# Patient Record
Sex: MALE | Race: WHITE
[De-identification: names, ages, dates, MRNs, and addresses within clinical notes are randomized per-mention and may not be internally consistent; named-entity substitution may affect disease eponyms.]

---

## 2018-05-28 ENCOUNTER — HOSPITAL ENCOUNTER (INPATIENT)
Dept: HOSPITAL 56 - MW.ED | Age: 53
LOS: 2 days | Discharge: HOME | DRG: 291 | End: 2018-05-30
Attending: INTERNAL MEDICINE | Admitting: INTERNAL MEDICINE
Payer: COMMERCIAL

## 2018-05-28 DIAGNOSIS — J44.1: ICD-10-CM

## 2018-05-28 DIAGNOSIS — F17.210: ICD-10-CM

## 2018-05-28 DIAGNOSIS — F32.9: ICD-10-CM

## 2018-05-28 DIAGNOSIS — I50.31: Primary | ICD-10-CM

## 2018-05-28 DIAGNOSIS — E66.8: ICD-10-CM

## 2018-05-28 DIAGNOSIS — J96.01: ICD-10-CM

## 2018-05-28 DIAGNOSIS — E11.65: ICD-10-CM

## 2018-05-28 DIAGNOSIS — D72.828: ICD-10-CM

## 2018-05-28 DIAGNOSIS — I11.0: ICD-10-CM

## 2018-05-28 LAB
CHLORIDE SERPL-SCNC: 99 MMOL/L (ref 98–107)
SODIUM SERPL-SCNC: 135 MMOL/L (ref 136–148)

## 2018-05-28 NOTE — EDM.PDOC
ED HPI GENERAL MEDICAL PROBLEM





- General


Chief Complaint: Respiratory Problem


Stated Complaint: SOB


Time Seen by Provider: 05/28/18 10:27





- History of Present Illness


INITIAL COMMENTS - FREE TEXT/NARRATIVE: 





HISTORY AND PHYSICAL:





History of present illness:


The patient is a 52-year-old male presents with a two-week history of 

progressive shortness of breath and coughing occasionally productive of phlegm 

and feeling like he has great difficulty breathing. The patient said that about 

2 weeks ago he thought he had cold symptoms and that there was some phlegm and 

he felt short of breath and that seemed to improve slightly from the phlegm 

perspective but the shortness of breath seemed to progress. Patient does have a 

history of sleep apnea and is supposed to use CPAP but due to his insurance he 

does not have that. He sleeps in a recliner for the most of the nights but 

occasionally he will sleep in bed. He says that he has not had a fever vomiting 

abdominal pain urinary issues leg pain or swelling. He denies chest pain but 

says that he feels like there is phlegm in there or fluid that he cannot get 

out. The shortness of breath seemed to be worse today and he had a restless 

night so he came in for evaluation. He has a history of borderline diabetes and 

thinks he has high blood pressure but he has not seen her provider in quite 

some time and has no medical diagnosis nor does he take any medications. The 

patient is a smoker of one pack a day but no drug use.





Review of systems: 


As per history of present illness and below otherwise all systems reviewed and 

negative.





Past medical history: 


As per history of present illness and as reviewed below otherwise 

noncontributory.





Surgical history: 


As per history of present illness and as reviewed below otherwise 

noncontributory.





Social history: 


No reported history of drug or alcohol abuse.





Family history: 


As per history of present illness and as reviewed below otherwise 

noncontributory.





Physical exam:


General: Well-developed well-nourished man who is severely overweight but is 

speaking clearly in the ED with breathlessness only with activity. His O2 sat 

on room air ranges from 88-91% on my evaluation. His elevated blood pressure is 

also noted by me


HEENT: Atraumatic, normocephalic, pupils reactive, negative for conjunctival 

pallor or scleral icterus, mucous membranes moist, throat clear, neck supple, 

nontender, trachea midline.


Lungs: Upper lobes Clear to auscultation, very diminished in the bases and poor 

air exchange overall, there are occasional coarse breath sounds and slight 

crackles in the bases, breath sounds equal bilaterally, chest nontender. There 

is no work of breathing stridor


Heart: S1S2, regular rate and rhythm no overt murmurs, the patient was not legs 

to evaluate JVD


Abdomen: Soft, nondistended, nontender. Negative for masses or 

hepatosplenomegaly. Negative for costovertebral tenderness.


Pelvis: Stable nontender.


Genitourinary: Deferred.


Rectal: Deferred.


Extremities: Atraumatic, negative for cords or calf pain. Neurovascular 

unremarkable. Is trace pedal edema but no leg asymmetry or calf tenderness


Neuro: Awake, alert, oriented. Cranial nerves II through XII unremarkable. 

Cerebellum unremarkable. Motor and sensory unremarkable throughout. Exam 

nonfocal.


Skin: There is no diaphoresis, no overt skin lesions or rashes and turgor is 

normal


Diagnostics:


EKG chest x-ray CBC CMP BNP troponin INR lactic acid





Therapeutics:


IV O2 monitor aspirin duo neb nitro paste, Solu-Medrol Lasix





Testing results were discussed with the patient and the case was discussed with 

Dr. Sumner at 11:32 AM. The patient will be admitted as an inpatient and treated 

for congestive heart failure which is new as well as hypertension. He is aware 

of testing results and is agreeable.





Impression: 


Dyspnea progressive, new CHF, hypertension untreated





Definitive disposition and diagnosis as appropriate pending reevaluation and 

review of above.





- Related Data


 Allergies











Allergy/AdvReac Type Severity Reaction Status Date / Time


 


No Known Allergies Allergy   Verified 05/28/18 10:37











Home Meds: 


 Home Meds





. [No Known Home Meds]  05/28/18 [History]











Past Medical History


HEENT History: Reports: None


Cardiovascular History: Reports: None


Respiratory History: Reports: None


Gastrointestinal History: Reports: None


Genitourinary History: Reports: Renal Calculus


Musculoskeletal History: Reports: None


Neurological History: Reports: None


Psychiatric History: Reports: None


Endocrine/Metabolic History: Reports: Other (See Below)


Other Endocrine/Metabolic History: borderline DM


Hematologic History: Reports: None


Immunologic History: Reports: None


Oncologic (Cancer) History: Reports: None


Dermatologic History: Reports: None





- Infectious Disease History


Infectious Disease History: Reports: Chicken Pox





- Past Surgical History


HEENT Surgical History: Reports: Other (See Below)





Social & Family History





- Family History


Cardiac: Reports: Hypertension


Respiratory: Reports: None


Endocrine/Metabolic: Reports: Diabetes, type II


Immunologic: Reports: None


Dermatologic: Reports: None


Oncologic: Reports: Other (See Below)


Other Oncologic Family History: patient dont know the exact kind of cancer





ED ROS GENERAL





- Review of Systems


Review Of Systems: ROS reveals no pertinent complaints other than HPI.





ED EXAM, GENERAL





- Physical Exam


Exam: See Below (See dictation)





Course





- Vital Signs


Last Recorded V/S: 


 Last Vital Signs











Temp  36.1 C   05/28/18 10:32


 


Pulse  117 H  05/28/18 10:32


 


Resp  20   05/28/18 10:32


 


BP  206/146 H  05/28/18 10:32


 


Pulse Ox  94 L  05/28/18 10:33














- Orders/Labs/Meds


Orders: 


 Active Orders 24 hr











 Category Date Time Status


 


 Patient Status [ADT] Stat ADT  05/28/18 11:39 Ordered


 


 Cardiac Monitoring [RC] .AS DIRECTED Care  05/28/18 10:33 Active


 


 EKG Documentation Completion [RC] STAT Care  05/28/18 10:33 Active


 


 Oxygen Therapy, ED [RC] ASDIRECTED Care  05/28/18 10:33 Active


 


 Pulse Oximetry [RC] ASDIRECTED Care  05/28/18 10:33 Active


 


 RT Aerosol Therapy [RC] ASDIRECTED Care  05/28/18 10:34 Active


 


 Chest 1V Frontal [CR] Stat Exams  05/28/18 10:33 Taken


 


 Sodium Chloride 0.9% [Saline Flush] Med  05/28/18 10:33 Active





 10 ml FLUSH ASDIRECTED PRN   


 


 Sodium Chloride 0.9% [Saline Flush] Med  05/28/18 10:33 Active





 2.5 ml FLUSH ASDIRECTED PRN   


 


 Saline Lock Insert [OM.PC] Stat Oth  05/28/18 10:33 Ordered








 Medication Orders





Sodium Chloride (Saline Flush)  10 ml FLUSH ASDIRECTED PRN


   PRN Reason: Keep Vein Open


   Last Admin: 05/28/18 10:48  Dose: 10 ml


Sodium Chloride (Saline Flush)  2.5 ml FLUSH ASDIRECTED PRN


   PRN Reason: Keep Vein Open


   Last Admin: 05/28/18 10:49  Dose: 2.5 ml








Labs: 


 Laboratory Tests











  05/28/18 05/28/18 05/28/18 Range/Units





  10:45 10:45 10:45 


 


WBC  11.82 H    (4.0-11.0)  K/uL


 


RBC  5.68    (4.50-5.90)  M/uL


 


Hgb  17.8 H    (13.0-17.0)  g/dL


 


Hct  50.4 H    (38.0-50.0)  %


 


MCV  88.7    (80.0-98.0)  fL


 


MCH  31.3    (27.0-32.0)  pg


 


MCHC  35.3    (31.0-37.0)  g/dL


 


RDW Std Deviation  41.8    (28.0-62.0)  fl


 


RDW Coeff of Yeis  13    (11.0-15.0)  %


 


Plt Count  262    (150-400)  K/uL


 


MPV  10.80    (7.40-12.00)  fL


 


Neut % (Auto)  67.8    (48.0-80.0)  %


 


Lymph % (Auto)  24.2    (16.0-40.0)  %


 


Mono % (Auto)  6.3    (0.0-15.0)  %


 


Eos % (Auto)  1.4    (0.0-7.0)  %


 


Baso % (Auto)  0.3    (0.0-1.5)  %


 


Neut # (Auto)  8.0 H    (1.4-5.7)  K/uL


 


Lymph # (Auto)  2.9 H    (0.6-2.4)  K/uL


 


Mono # (Auto)  0.8    (0.0-0.8)  K/uL


 


Eos # (Auto)  0.2    (0.0-0.7)  K/uL


 


Baso # (Auto)  0.0    (0.0-0.1)  K/uL


 


Nucleated RBC %  0.0    /100WBC


 


Nucleated RBCs #  0    K/uL


 


INR   1.05   


 


Lactate     (0.20-2.00)  mmol/L


 


Sodium    135 L  (136-148)  mmol/L


 


Potassium    4.4  (3.5-5.1)  mmol/L


 


Chloride    99  ()  mmol/L


 


Carbon Dioxide    26.9  (21.0-32.0)  mmol/L


 


BUN    14  (7.0-18.0)  mg/dL


 


Creatinine    1.0  (0.8-1.3)  mg/dL


 


Est Cr Clr Drug Dosing    97.00  mL/min


 


Estimated GFR (MDRD)    > 60.0  ml/min


 


Glucose    245 H  ()  mg/dL


 


Calcium    8.9  (8.5-10.1)  mg/dL


 


Total Bilirubin    0.6  (0.2-1.0)  mg/dL


 


AST    18  (15-37)  IU/L


 


ALT    32  (14-63)  IU/L


 


Alkaline Phosphatase    63  ()  U/L


 


Troponin I    < 0.050  (0.000-0.056)  ng/mL


 


B-Natriuretic Peptide     (<100)  PG/ML


 


Total Protein    7.9  (6.4-8.2)  g/dL


 


Albumin    3.6  (3.4-5.0)  g/dL


 


Globulin    4.3 H  (2.0-3.5)  g/dL


 


Albumin/Globulin Ratio    0.8 L  (1.3-2.8)  














  05/28/18 05/28/18 Range/Units





  10:45 10:45 


 


WBC    (4.0-11.0)  K/uL


 


RBC    (4.50-5.90)  M/uL


 


Hgb    (13.0-17.0)  g/dL


 


Hct    (38.0-50.0)  %


 


MCV    (80.0-98.0)  fL


 


MCH    (27.0-32.0)  pg


 


MCHC    (31.0-37.0)  g/dL


 


RDW Std Deviation    (28.0-62.0)  fl


 


RDW Coeff of Yesi    (11.0-15.0)  %


 


Plt Count    (150-400)  K/uL


 


MPV    (7.40-12.00)  fL


 


Neut % (Auto)    (48.0-80.0)  %


 


Lymph % (Auto)    (16.0-40.0)  %


 


Mono % (Auto)    (0.0-15.0)  %


 


Eos % (Auto)    (0.0-7.0)  %


 


Baso % (Auto)    (0.0-1.5)  %


 


Neut # (Auto)    (1.4-5.7)  K/uL


 


Lymph # (Auto)    (0.6-2.4)  K/uL


 


Mono # (Auto)    (0.0-0.8)  K/uL


 


Eos # (Auto)    (0.0-0.7)  K/uL


 


Baso # (Auto)    (0.0-0.1)  K/uL


 


Nucleated RBC %    /100WBC


 


Nucleated RBCs #    K/uL


 


INR    


 


Lactate   2.2 H  (0.20-2.00)  mmol/L


 


Sodium    (136-148)  mmol/L


 


Potassium    (3.5-5.1)  mmol/L


 


Chloride    ()  mmol/L


 


Carbon Dioxide    (21.0-32.0)  mmol/L


 


BUN    (7.0-18.0)  mg/dL


 


Creatinine    (0.8-1.3)  mg/dL


 


Est Cr Clr Drug Dosing    mL/min


 


Estimated GFR (MDRD)    ml/min


 


Glucose    ()  mg/dL


 


Calcium    (8.5-10.1)  mg/dL


 


Total Bilirubin    (0.2-1.0)  mg/dL


 


AST    (15-37)  IU/L


 


ALT    (14-63)  IU/L


 


Alkaline Phosphatase    ()  U/L


 


Troponin I    (0.000-0.056)  ng/mL


 


B-Natriuretic Peptide  563 H   (<100)  PG/ML


 


Total Protein    (6.4-8.2)  g/dL


 


Albumin    (3.4-5.0)  g/dL


 


Globulin    (2.0-3.5)  g/dL


 


Albumin/Globulin Ratio    (1.3-2.8)  











Meds: 


Medications











Generic Name Dose Route Start Last Admin





  Trade Name Freq  PRN Reason Stop Dose Admin


 


Sodium Chloride  10 ml  05/28/18 10:33  05/28/18 10:48





  Saline Flush  FLUSH   10 ml





  ASDIRECTED PRN   Administration





  Keep Vein Open   





     





     





     


 


Sodium Chloride  2.5 ml  05/28/18 10:33 05/28/18 10:49





  Saline Flush  FLUSH   2.5 ml





  ASDIRECTED PRN   Administration





  Keep Vein Open   





     





     





     














Discontinued Medications














Generic Name Dose Route Start Last Admin





  Trade Name Freq  PRN Reason Stop Dose Admin


 


Albuterol/Ipratropium  3 ml  05/28/18 10:33  05/28/18 10:48





  Duoneb 3.0-0.5 Mg/3 Ml  NEB  05/28/18 10:34  3 ml





  ONETIME ONE   Administration





     





     





     





     


 


Aspirin  324 mg  05/28/18 10:33  05/28/18 10:48





  Aspirin  PO  05/28/18 10:34  324 mg





  ONETIME ONE   Administration





     





     





     





     


 


Furosemide  60 mg  05/28/18 11:34  





  Lasix  IVPUSH  05/28/18 11:35  





  NOW ONE   





     





     





     





     


 


Methylprednisolone Sodium Succinate  125 mg  05/28/18 10:53  05/28/18 10:56





  Solu-Medrol  IVPUSH  05/28/18 10:54  125 mg





  ONETIME ONE   Administration





     





     





     





     


 


Nitroglycerin  0.5 gm  05/28/18 10:33  05/28/18 10:48





  Nitro-Bid 2%  TOP  05/28/18 10:34  0.5 gm





  ONETIME ONE   Administration





     





     





     





     














Departure





- Departure


Time of Disposition: 11:40


Disposition: Admitted As Inpatient 68


Condition: Good


Clinical Impression: 


Congestive heart failure


Qualifiers:


 Heart failure type: unspecified Heart failure chronicity: unspecified 

Qualified Code(s): I50.9 - Heart failure, unspecified





Hypertension


Qualifiers:


 Hypertension type: unspecified Qualified Code(s): I10 - Essential (primary) 

hypertension





Dyspnea


Qualifiers:


 Dyspnea type: unspecified Qualified Code(s): R06.00 - Dyspnea, unspecified








- Discharge Information


Referrals: 


PCP,Unknown [Primary Care Provider] - 


Forms:  ED Department Discharge





- My Orders


Last 24 Hours: 


My Active Orders





05/28/18 10:33


Cardiac Monitoring [RC] .AS DIRECTED 


EKG Documentation Completion [RC] STAT 


Oxygen Therapy, ED [RC] ASDIRECTED 


Pulse Oximetry [RC] ASDIRECTED 


Chest 1V Frontal [CR] Stat 


Sodium Chloride 0.9% [Saline Flush]   10 ml FLUSH ASDIRECTED PRN 


Sodium Chloride 0.9% [Saline Flush]   2.5 ml FLUSH ASDIRECTED PRN 


Saline Lock Insert [OM.PC] Stat 





05/28/18 10:34


RT Aerosol Therapy [RC] ASDIRECTED 





05/28/18 11:39


Patient Status [ADT] Stat 














- Assessment/Plan


Last 24 Hours: 


My Active Orders





05/28/18 10:33


Cardiac Monitoring [RC] .AS DIRECTED 


EKG Documentation Completion [RC] STAT 


Oxygen Therapy, ED [RC] ASDIRECTED 


Pulse Oximetry [RC] ASDIRECTED 


Chest 1V Frontal [CR] Stat 


Sodium Chloride 0.9% [Saline Flush]   10 ml FLUSH ASDIRECTED PRN 


Sodium Chloride 0.9% [Saline Flush]   2.5 ml FLUSH ASDIRECTED PRN 


Saline Lock Insert [OM.PC] Stat 





05/28/18 10:34


RT Aerosol Therapy [RC] ASDIRECTED 





05/28/18 11:39


Patient Status [ADT] Stat

## 2018-05-28 NOTE — PCM.HP
H&P History of Present Illness





- General


Date of Service: 05/28/18


Admit Problem/Dx: 


 Admission Diagnosis/Problem





Admission Diagnosis/Problem      Congestive heart failure











- History of Present Illness


Initial Comments - Free Text/Narative: 


53 yo m admitted for SOB and acute hypoxic respiratory failure. He has had 

little medical care over his life and last time seeing doctor was over 10 years 

ago. He states that for 2 weeks now he has been experiencing severe sob, akbar 

and orthopena. It worsened to the point where he finally decided to present to 

the ED. He admists to having an unhealthy lifestyle and poor diet. He has no 

diagnosed HTN, COPD or DM but at admission in ED he had uncontrolled HTN over 

200/100 and his BG was 265. He also admits to smoking 1 ppd for 20 years. His 

symptoms improved after he received the oxygen. He denies any PMH and does not 

take any medications. He denies chest pain, edema, palpitations, syncope, 

numbness ,tingling, fever, chills or night sweats. 








- Related Data


Allergies/Adverse Reactions: 


 Allergies











Allergy/AdvReac Type Severity Reaction Status Date / Time


 


No Known Allergies Allergy   Verified 05/28/18 10:37











Home Medications: 


 Home Meds





. [No Known Home Meds]  05/28/18 [History]











Past Medical History


HEENT History: Reports: None


Cardiovascular History: Reports: None


Other Cardiovascular History: pt reports he had borderline HTN a few years ago


Respiratory History: Reports: None


Gastrointestinal History: Reports: None


Genitourinary History: Reports: Renal Calculus


Musculoskeletal History: Reports: None


Neurological History: Reports: None


Psychiatric History: Reports: None


Other Psychiatric History: states he may have a little bit of depression


Endocrine/Metabolic History: Reports: Other (See Below)


Other Endocrine/Metabolic History: borderline DM


Hematologic History: Reports: None


Immunologic History: Reports: None


Oncologic (Cancer) History: Reports: None


Dermatologic History: Reports: None





- Infectious Disease History


Infectious Disease History: Reports: Chicken Pox





- Past Surgical History


HEENT Surgical History: Reports: Other (See Below)





Social & Family History





- Family History


Family Medical History: Noncontributory


Cardiac: Reports: Hypertension


Respiratory: Reports: None


Endocrine/Metabolic: Reports: Diabetes, type II


Immunologic: Reports: None


Dermatologic: Reports: None


Oncologic: Reports: Other (See Below)


Other Oncologic Family History: patient dont know the exact kind of cancer





- Tobacco Use


Smoking Status *Q: Former Smoker


Years of Tobacco use: 20


Packs/Tins Daily: 0.5


Used Tobacco, but Quit: Yes


Month/Year Tobacco Last Used: 5/2018


Tobacco Use Comment: pt states he quit smoking yesterday





- Caffeine Use


Caffeine Use: Reports: Coffee, Energy Drinks, Soda, Tea





- Recreational Drug Use


Recreational Drug Use: No





H&P Review of Systems





- Review of Systems:


Review Of Systems: See Below


General: Reports: No Symptoms


HEENT: Reports: No Symptoms


Pulmonary: Reports: Shortness of Breath


Cardiovascular: Reports: Dyspnea on Exertion


Gastrointestinal: Reports: No Symptoms


Genitourinary: Reports: No Symptoms


Musculoskeletal: Reports: No Symptoms


Skin: Reports: No Symptoms


Psychiatric: Reports: No Symptoms


Neurological: Reports: No Symptoms


Hematologic/Lymphatic: Reports: No Symptoms


Immunologic: Reports: No Symptoms





Exam





- Exam


Exam: See Below





- Vital Signs


Vital Signs: 


 Last Vital Signs











Temp  36.1 C   05/28/18 10:32


 


Pulse  109 H  05/28/18 12:00


 


Resp  18   05/28/18 12:00


 


BP  155/101 H  05/28/18 12:00


 


Pulse Ox  94 L  05/28/18 12:00











Weight: 142.882 kg





- Exam


Quality Assessment: Supplemental Oxygen


General: Alert, Oriented, Cooperative


HEENT: Conjunctiva Clear, EACs Clear, EOMI, Hearing Intact, Mucosa Moist & Pink

, Nares Patent, Normal Nasal Septum, Posterior Pharynx Clear, Pupils Equal, 

Pupils Reactive


Neck: Supple, Trachea Midline.  No: JVD


Lungs: Decreased Breath Sounds (non-focal), Crackles (diffuse inspiratory )


Cardiovascular: Regular Rate, Regular Rhythm


GI/Abdominal Exam: Normal Bowel Sounds, Soft, Non-Tender


Extremities: Normal Inspection, Normal Range of Motion, Non-Tender, No Pedal 

Edema, Normal Capillary Refill


Peripheral Pulses: 1+: Dorsalis Pedis (L), Dorsalis Pedis (R)


Skin: Warm, Dry, Intact


Neurological: Cranial Nerves Intact, Reflexes Equal Bilateral


Neuro Extensive - Mental Status: Alert, Oriented x3, Normal Mood/Affect, Normal 

Cognition


Psychiatric: Alert, Normal Affect, Normal Mood





- Patient Data


Lab Results Last 24 hrs: 


 Laboratory Results - last 24 hr











  05/28/18 05/28/18 05/28/18 Range/Units





  10:45 10:45 10:45 


 


WBC  11.82 H    (4.0-11.0)  K/uL


 


RBC  5.68    (4.50-5.90)  M/uL


 


Hgb  17.8 H    (13.0-17.0)  g/dL


 


Hct  50.4 H    (38.0-50.0)  %


 


MCV  88.7    (80.0-98.0)  fL


 


MCH  31.3    (27.0-32.0)  pg


 


MCHC  35.3    (31.0-37.0)  g/dL


 


RDW Std Deviation  41.8    (28.0-62.0)  fl


 


RDW Coeff of Yesi  13    (11.0-15.0)  %


 


Plt Count  262    (150-400)  K/uL


 


MPV  10.80    (7.40-12.00)  fL


 


Neut % (Auto)  67.8    (48.0-80.0)  %


 


Lymph % (Auto)  24.2    (16.0-40.0)  %


 


Mono % (Auto)  6.3    (0.0-15.0)  %


 


Eos % (Auto)  1.4    (0.0-7.0)  %


 


Baso % (Auto)  0.3    (0.0-1.5)  %


 


Neut # (Auto)  8.0 H    (1.4-5.7)  K/uL


 


Lymph # (Auto)  2.9 H    (0.6-2.4)  K/uL


 


Mono # (Auto)  0.8    (0.0-0.8)  K/uL


 


Eos # (Auto)  0.2    (0.0-0.7)  K/uL


 


Baso # (Auto)  0.0    (0.0-0.1)  K/uL


 


Nucleated RBC %  0.0    /100WBC


 


Nucleated RBCs #  0    K/uL


 


INR   1.05   


 


Lactate     (0.20-2.00)  mmol/L


 


Sodium    135 L  (136-148)  mmol/L


 


Potassium    4.4  (3.5-5.1)  mmol/L


 


Chloride    99  ()  mmol/L


 


Carbon Dioxide    26.9  (21.0-32.0)  mmol/L


 


BUN    14  (7.0-18.0)  mg/dL


 


Creatinine    1.0  (0.8-1.3)  mg/dL


 


Est Cr Clr Drug Dosing    97.00  mL/min


 


Estimated GFR (MDRD)    > 60.0  ml/min


 


Glucose    245 H  ()  mg/dL


 


Calcium    8.9  (8.5-10.1)  mg/dL


 


Total Bilirubin    0.6  (0.2-1.0)  mg/dL


 


AST    18  (15-37)  IU/L


 


ALT    32  (14-63)  IU/L


 


Alkaline Phosphatase    63  ()  U/L


 


Troponin I    < 0.050  (0.000-0.056)  ng/mL


 


B-Natriuretic Peptide     (<100)  PG/ML


 


Total Protein    7.9  (6.4-8.2)  g/dL


 


Albumin    3.6  (3.4-5.0)  g/dL


 


Globulin    4.3 H  (2.0-3.5)  g/dL


 


Albumin/Globulin Ratio    0.8 L  (1.3-2.8)  














  05/28/18 05/28/18 Range/Units





  10:45 10:45 


 


WBC    (4.0-11.0)  K/uL


 


RBC    (4.50-5.90)  M/uL


 


Hgb    (13.0-17.0)  g/dL


 


Hct    (38.0-50.0)  %


 


MCV    (80.0-98.0)  fL


 


MCH    (27.0-32.0)  pg


 


MCHC    (31.0-37.0)  g/dL


 


RDW Std Deviation    (28.0-62.0)  fl


 


RDW Coeff of Yesi    (11.0-15.0)  %


 


Plt Count    (150-400)  K/uL


 


MPV    (7.40-12.00)  fL


 


Neut % (Auto)    (48.0-80.0)  %


 


Lymph % (Auto)    (16.0-40.0)  %


 


Mono % (Auto)    (0.0-15.0)  %


 


Eos % (Auto)    (0.0-7.0)  %


 


Baso % (Auto)    (0.0-1.5)  %


 


Neut # (Auto)    (1.4-5.7)  K/uL


 


Lymph # (Auto)    (0.6-2.4)  K/uL


 


Mono # (Auto)    (0.0-0.8)  K/uL


 


Eos # (Auto)    (0.0-0.7)  K/uL


 


Baso # (Auto)    (0.0-0.1)  K/uL


 


Nucleated RBC %    /100WBC


 


Nucleated RBCs #    K/uL


 


INR    


 


Lactate   2.2 H  (0.20-2.00)  mmol/L


 


Sodium    (136-148)  mmol/L


 


Potassium    (3.5-5.1)  mmol/L


 


Chloride    ()  mmol/L


 


Carbon Dioxide    (21.0-32.0)  mmol/L


 


BUN    (7.0-18.0)  mg/dL


 


Creatinine    (0.8-1.3)  mg/dL


 


Est Cr Clr Drug Dosing    mL/min


 


Estimated GFR (MDRD)    ml/min


 


Glucose    ()  mg/dL


 


Calcium    (8.5-10.1)  mg/dL


 


Total Bilirubin    (0.2-1.0)  mg/dL


 


AST    (15-37)  IU/L


 


ALT    (14-63)  IU/L


 


Alkaline Phosphatase    ()  U/L


 


Troponin I    (0.000-0.056)  ng/mL


 


B-Natriuretic Peptide  563 H   (<100)  PG/ML


 


Total Protein    (6.4-8.2)  g/dL


 


Albumin    (3.4-5.0)  g/dL


 


Globulin    (2.0-3.5)  g/dL


 


Albumin/Globulin Ratio    (1.3-2.8)  











Result Diagrams: 


 05/28/18 10:45





 05/28/18 10:45


Problem List Initiated/Reviewed/Updated: Yes


Orders Last 24hrs: 


 Active Orders 24 hr











 Category Date Time Status


 


 Patient Status [ADT] Routine ADT  05/28/18 13:01 Ordered


 


 Patient Status [ADT] Stat ADT  05/28/18 11:39 Active


 


 Blood Glucose Check, Bedside [RC] WITHMEALSANDBED Care  05/28/18 13:01 Ordered


 


 Cardiac Monitoring [RC] .AS DIRECTED Care  05/28/18 10:33 Active


 


 EKG Documentation Completion [RC] STAT Care  05/28/18 10:33 Active


 


 Height and Weight [RC] DAILY Care  05/28/18 13:01 Ordered


 


 Intake and Output [RC] QSHIFT Care  05/28/18 13:02 Ordered


 


 Oxygen Therapy [RC] PRN Care  05/28/18 13:01 Ordered


 


 Oxygen Therapy, ED [RC] ASDIRECTED Care  05/28/18 10:33 Active


 


 Pulse Oximetry [RC] ASDIRECTED Care  05/28/18 10:33 Active


 


 RT Aerosol Therapy [RC] ASDIRECTED Care  05/28/18 10:34 Active


 


 Up ad Bobbi [RC] ASDIRECTED Care  05/28/18 13:01 Ordered


 


 VTE/DVT Education [RC] PER UNIT ROUTINE Care  05/28/18 13:01 Ordered


 


 Vital Signs [RC] Q4H Care  05/28/18 13:01 Ordered


 


 Heart Healthy Diet [DIET] Diet  05/28/18 Dinner Active


 


 Chest 1V Frontal [CR] Stat Exams  05/28/18 10:33 Taken


 


 BASIC METABOLIC PANEL,BMP [CHEM] AM Lab  05/29/18 05:11 Ordered


 


 BASIC METABOLIC PANEL,BMP [CHEM] AM Lab  05/30/18 05:11 Ordered


 


 BASIC METABOLIC PANEL,BMP [CHEM] AM Lab  05/31/18 05:11 Ordered


 


 BASIC METABOLIC PANEL,BMP [CHEM] AM Lab  06/01/18 05:11 Ordered


 


 BASIC METABOLIC PANEL,BMP [CHEM] AM Lab  06/02/18 05:11 Ordered


 


 CBC WITH AUTO DIFF [HEME] AM Lab  05/29/18 05:11 Ordered


 


 CBC WITH AUTO DIFF [HEME] AM Lab  05/30/18 05:11 Ordered


 


 CBC WITH AUTO DIFF [HEME] AM Lab  05/31/18 05:11 Ordered


 


 CBC WITH AUTO DIFF [HEME] AM Lab  06/01/18 05:11 Ordered


 


 CBC WITH AUTO DIFF [HEME] AM Lab  06/02/18 05:11 Ordered


 


 GLYCOSYLATED HEMOGLOBIN,HGBA1C [CHEM] Routine Lab  05/28/18 13:01 Ordered


 


 MAGNESIUM [CHEM] AM Lab  05/29/18 05:11 Ordered


 


 Acetaminophen [Tylenol] Med  05/28/18 13:01 Ordered





 650 mg PO Q4H PRN   


 


 Docusate Sodium [Colace] Med  05/28/18 13:01 Ordered





 100 mg PO BID PRN   


 


 Enoxaparin [Lovenox] Med  05/28/18 13:15 Ordered





 40 mg SUBCUT Q24H   


 


 Ondansetron [Zofran ODT] Med  05/28/18 13:01 Ordered





 4 mg PO Q4H PRN   


 


 Sodium Chloride 0.9% [Saline Flush] Med  05/28/18 10:33 Active





 10 ml FLUSH ASDIRECTED PRN   


 


 Sodium Chloride 0.9% [Saline Flush] Med  05/28/18 10:33 Active





 2.5 ml FLUSH ASDIRECTED PRN   


 


 Saline Lock Insert [OM.PC] Stat Oth  05/28/18 10:33 Ordered


 


 Sequential Compression Device [OM.PC] Per Unit Routine Oth  05/28/18 13:02 

Ordered


 


 Resuscitation Status Routine Resus Stat  05/28/18 13:01 Ordered








 Medication Orders





Sodium Chloride (Saline Flush)  10 ml FLUSH ASDIRECTED PRN


   PRN Reason: Keep Vein Open


   Last Admin: 05/28/18 10:48  Dose: 10 ml


Sodium Chloride (Saline Flush)  2.5 ml FLUSH ASDIRECTED PRN


   PRN Reason: Keep Vein Open


   Last Admin: 05/28/18 10:49  Dose: 2.5 ml








Assessment/Plan Comment:: 


Acute Hypoxic Respiratory Failure 


-differential includes Acute CHF exacerbation and/or Acute COPD exacerbation 


-admit to obs 


-cardiac monitoring and supplemental O2 


-Lasix diuresis


-start duonebs PRN for sob 


-obtain echo





#HTN, uncontrolled


-no prior diagnosis but likely long standing 


-start Lisinopril 10 mg QD 


-continue monitor and increase dose or add medications as tolerated 





#Hyperglycemia 


-no previous diagnosis of DM


-monitor BG and obtain HbA1C





#Tobacco Abuse, 1 ppd, 20 pack year history 


-nicotine patch 





code: full


diet: cardiac


DVT Prophylaxis: Lovenox 


-

## 2018-05-29 LAB
CHLORIDE SERPL-SCNC: 98 MMOL/L (ref 98–107)
SODIUM SERPL-SCNC: 135 MMOL/L (ref 136–148)

## 2018-05-29 NOTE — CR
EXAM DATE: 18



PATIENT'S AGE: 52





Patient: LORRI LANGE



Facility: [place], [place]

Patient ID: [id number]

Site Patient ID: [id number].

Site Accession #: [id number].

: 1965

Study: XRay Chest [id number]/28/2018 11:01:42 AM

Ordering Physician: Mirna Hoyt



Final Report: 

INDICATION:

Chest pain; shortness of breath.



COMPARISON:

None.



TECHNIQUE:

Portable AP chest.



FINDINGS:

Mild cardiomegaly. Pulmonary congestion. Small bilateral pleural effusion.



IMPRESSION:

CHF.





Dictated by Anahy Hale MD @ May 28 2018 11:14AM

(Electronic Signature)







Report Signed by Proxy.
[facility]

## 2018-05-29 NOTE — PCM.PN
- General Info


Date of Service: 05/29/18


Admission Dx/Problem (Free Text): 


 Admission Diagnosis/Problem





Admission Diagnosis/Problem      Congestive heart failure








Subjective Update: 


No overnight events. Patient doing better today. BG levels 200-250.  





Functional Status: Reports: Pain Controlled, Tolerating Diet, Ambulating





- Review of Systems


General: Reports: No Symptoms


HEENT: Reports: No Symptoms


Pulmonary: Reports: Shortness of Breath, Other (orthopnea)


Cardiovascular: Reports: No Symptoms


Gastrointestinal: Reports: No Symptoms


Genitourinary: Reports: No Symptoms


Musculoskeletal: Reports: No Symptoms


Skin: Reports: No Symptoms


Neurological: Reports: No Symptoms


Psychiatric: Reports: No Symptoms





- Patient Data


Vitals - Most Recent: 


 Last Vital Signs











Temp  36.4 C   05/29/18 07:32


 


Pulse  84   05/29/18 07:32


 


Resp  16   05/29/18 07:32


 


BP  107/54 L  05/29/18 08:34


 


Pulse Ox  91 L  05/29/18 07:32











Weight - Most Recent: 141.606 kg


I&O - Last 24 Hours: 


 Intake & Output











 05/28/18 05/29/18 05/29/18





 22:59 06:59 14:59


 


Intake Total 640 900 


 


Output Total 1870 1180 


 


Balance -1230 -280 











Lab Results Last 24 Hours: 


 Laboratory Results - last 24 hr











  05/28/18 05/28/18 05/28/18 Range/Units





  10:45 10:45 10:45 


 


WBC  11.82 H    (4.0-11.0)  K/uL


 


RBC  5.68    (4.50-5.90)  M/uL


 


Hgb  17.8 H    (13.0-17.0)  g/dL


 


Hct  50.4 H    (38.0-50.0)  %


 


MCV  88.7    (80.0-98.0)  fL


 


MCH  31.3    (27.0-32.0)  pg


 


MCHC  35.3    (31.0-37.0)  g/dL


 


RDW Std Deviation  41.8    (28.0-62.0)  fl


 


RDW Coeff of Yesi  13    (11.0-15.0)  %


 


Plt Count  262    (150-400)  K/uL


 


MPV  10.80    (7.40-12.00)  fL


 


Neut % (Auto)  67.8    (48.0-80.0)  %


 


Lymph % (Auto)  24.2    (16.0-40.0)  %


 


Mono % (Auto)  6.3    (0.0-15.0)  %


 


Eos % (Auto)  1.4    (0.0-7.0)  %


 


Baso % (Auto)  0.3    (0.0-1.5)  %


 


Neut # (Auto)  8.0 H    (1.4-5.7)  K/uL


 


Lymph # (Auto)  2.9 H    (0.6-2.4)  K/uL


 


Mono # (Auto)  0.8    (0.0-0.8)  K/uL


 


Eos # (Auto)  0.2    (0.0-0.7)  K/uL


 


Baso # (Auto)  0.0    (0.0-0.1)  K/uL


 


Nucleated RBC %  0.0    /100WBC


 


Nucleated RBCs #  0    K/uL


 


INR   1.05   


 


Lactate     (0.20-2.00)  mmol/L


 


Sodium    135 L  (136-148)  mmol/L


 


Potassium    4.4  (3.5-5.1)  mmol/L


 


Chloride    99  ()  mmol/L


 


Carbon Dioxide    26.9  (21.0-32.0)  mmol/L


 


BUN    14  (7.0-18.0)  mg/dL


 


Creatinine    1.0  (0.8-1.3)  mg/dL


 


Est Cr Clr Drug Dosing    97.00  mL/min


 


Estimated GFR (MDRD)    > 60.0  ml/min


 


Glucose    245 H  ()  mg/dL


 


POC Glucose     ()  mg/dL


 


Hemoglobin A1c     (4.5-6.2)  %


 


Calcium    8.9  (8.5-10.1)  mg/dL


 


Magnesium     (1.5-2.0)  mg/dL


 


Total Bilirubin    0.6  (0.2-1.0)  mg/dL


 


AST    18  (15-37)  IU/L


 


ALT    32  (14-63)  IU/L


 


Alkaline Phosphatase    63  ()  U/L


 


Troponin I    < 0.050  (0.000-0.056)  ng/mL


 


B-Natriuretic Peptide     (<100)  PG/ML


 


Total Protein    7.9  (6.4-8.2)  g/dL


 


Albumin    3.6  (3.4-5.0)  g/dL


 


Globulin    4.3 H  (2.0-3.5)  g/dL


 


Albumin/Globulin Ratio    0.8 L  (1.3-2.8)  














  05/28/18 05/28/18 05/28/18 Range/Units





  10:45 10:45 10:45 


 


WBC     (4.0-11.0)  K/uL


 


RBC     (4.50-5.90)  M/uL


 


Hgb     (13.0-17.0)  g/dL


 


Hct     (38.0-50.0)  %


 


MCV     (80.0-98.0)  fL


 


MCH     (27.0-32.0)  pg


 


MCHC     (31.0-37.0)  g/dL


 


RDW Std Deviation     (28.0-62.0)  fl


 


RDW Coeff of Yesi     (11.0-15.0)  %


 


Plt Count     (150-400)  K/uL


 


MPV     (7.40-12.00)  fL


 


Neut % (Auto)     (48.0-80.0)  %


 


Lymph % (Auto)     (16.0-40.0)  %


 


Mono % (Auto)     (0.0-15.0)  %


 


Eos % (Auto)     (0.0-7.0)  %


 


Baso % (Auto)     (0.0-1.5)  %


 


Neut # (Auto)     (1.4-5.7)  K/uL


 


Lymph # (Auto)     (0.6-2.4)  K/uL


 


Mono # (Auto)     (0.0-0.8)  K/uL


 


Eos # (Auto)     (0.0-0.7)  K/uL


 


Baso # (Auto)     (0.0-0.1)  K/uL


 


Nucleated RBC %     /100WBC


 


Nucleated RBCs #     K/uL


 


INR     


 


Lactate   2.2 H   (0.20-2.00)  mmol/L


 


Sodium     (136-148)  mmol/L


 


Potassium     (3.5-5.1)  mmol/L


 


Chloride     ()  mmol/L


 


Carbon Dioxide     (21.0-32.0)  mmol/L


 


BUN     (7.0-18.0)  mg/dL


 


Creatinine     (0.8-1.3)  mg/dL


 


Est Cr Clr Drug Dosing     mL/min


 


Estimated GFR (MDRD)     ml/min


 


Glucose     ()  mg/dL


 


POC Glucose     ()  mg/dL


 


Hemoglobin A1c    10.0 H  (4.5-6.2)  %


 


Calcium     (8.5-10.1)  mg/dL


 


Magnesium     (1.5-2.0)  mg/dL


 


Total Bilirubin     (0.2-1.0)  mg/dL


 


AST     (15-37)  IU/L


 


ALT     (14-63)  IU/L


 


Alkaline Phosphatase     ()  U/L


 


Troponin I     (0.000-0.056)  ng/mL


 


B-Natriuretic Peptide  563 H    (<100)  PG/ML


 


Total Protein     (6.4-8.2)  g/dL


 


Albumin     (3.4-5.0)  g/dL


 


Globulin     (2.0-3.5)  g/dL


 


Albumin/Globulin Ratio     (1.3-2.8)  














  05/28/18 05/28/18 05/28/18 Range/Units





  15:01 16:39 21:26 


 


WBC     (4.0-11.0)  K/uL


 


RBC     (4.50-5.90)  M/uL


 


Hgb     (13.0-17.0)  g/dL


 


Hct     (38.0-50.0)  %


 


MCV     (80.0-98.0)  fL


 


MCH     (27.0-32.0)  pg


 


MCHC     (31.0-37.0)  g/dL


 


RDW Std Deviation     (28.0-62.0)  fl


 


RDW Coeff of Yesi     (11.0-15.0)  %


 


Plt Count     (150-400)  K/uL


 


MPV     (7.40-12.00)  fL


 


Neut % (Auto)     (48.0-80.0)  %


 


Lymph % (Auto)     (16.0-40.0)  %


 


Mono % (Auto)     (0.0-15.0)  %


 


Eos % (Auto)     (0.0-7.0)  %


 


Baso % (Auto)     (0.0-1.5)  %


 


Neut # (Auto)     (1.4-5.7)  K/uL


 


Lymph # (Auto)     (0.6-2.4)  K/uL


 


Mono # (Auto)     (0.0-0.8)  K/uL


 


Eos # (Auto)     (0.0-0.7)  K/uL


 


Baso # (Auto)     (0.0-0.1)  K/uL


 


Nucleated RBC %     /100WBC


 


Nucleated RBCs #     K/uL


 


INR     


 


Lactate  2.3 H    (0.20-2.00)  mmol/L


 


Sodium     (136-148)  mmol/L


 


Potassium     (3.5-5.1)  mmol/L


 


Chloride     ()  mmol/L


 


Carbon Dioxide     (21.0-32.0)  mmol/L


 


BUN     (7.0-18.0)  mg/dL


 


Creatinine     (0.8-1.3)  mg/dL


 


Est Cr Clr Drug Dosing     mL/min


 


Estimated GFR (MDRD)     ml/min


 


Glucose     ()  mg/dL


 


POC Glucose   283 H  321 H  ()  mg/dL


 


Hemoglobin A1c     (4.5-6.2)  %


 


Calcium     (8.5-10.1)  mg/dL


 


Magnesium     (1.5-2.0)  mg/dL


 


Total Bilirubin     (0.2-1.0)  mg/dL


 


AST     (15-37)  IU/L


 


ALT     (14-63)  IU/L


 


Alkaline Phosphatase     ()  U/L


 


Troponin I     (0.000-0.056)  ng/mL


 


B-Natriuretic Peptide     (<100)  PG/ML


 


Total Protein     (6.4-8.2)  g/dL


 


Albumin     (3.4-5.0)  g/dL


 


Globulin     (2.0-3.5)  g/dL


 


Albumin/Globulin Ratio     (1.3-2.8)  














  05/29/18 05/29/18 Range/Units





  04:50 04:50 


 


WBC  15.61 H   (4.0-11.0)  K/uL


 


RBC  5.07   (4.50-5.90)  M/uL


 


Hgb  15.3   (13.0-17.0)  g/dL


 


Hct  45.0   (38.0-50.0)  %


 


MCV  88.8   (80.0-98.0)  fL


 


MCH  30.2   (27.0-32.0)  pg


 


MCHC  34.0   (31.0-37.0)  g/dL


 


RDW Std Deviation  41.8   (28.0-62.0)  fl


 


RDW Coeff of Yesi  13   (11.0-15.0)  %


 


Plt Count  255   (150-400)  K/uL


 


MPV  10.80   (7.40-12.00)  fL


 


Neut % (Auto)  88.6 H   (48.0-80.0)  %


 


Lymph % (Auto)  7.1 L   (16.0-40.0)  %


 


Mono % (Auto)  4.3   (0.0-15.0)  %


 


Eos % (Auto)  0.0   (0.0-7.0)  %


 


Baso % (Auto)  0.0   (0.0-1.5)  %


 


Neut # (Auto)  13.8 H   (1.4-5.7)  K/uL


 


Lymph # (Auto)  1.1   (0.6-2.4)  K/uL


 


Mono # (Auto)  0.7   (0.0-0.8)  K/uL


 


Eos # (Auto)  0.0   (0.0-0.7)  K/uL


 


Baso # (Auto)  0.0   (0.0-0.1)  K/uL


 


Nucleated RBC %  0.0   /100WBC


 


Nucleated RBCs #  0   K/uL


 


INR    


 


Lactate    (0.20-2.00)  mmol/L


 


Sodium   135 L  (136-148)  mmol/L


 


Potassium   4.4  (3.5-5.1)  mmol/L


 


Chloride   98  ()  mmol/L


 


Carbon Dioxide   31.1  (21.0-32.0)  mmol/L


 


BUN   25 H  (7.0-18.0)  mg/dL


 


Creatinine   1.2  (0.8-1.3)  mg/dL


 


Est Cr Clr Drug Dosing   80.83  mL/min


 


Estimated GFR (MDRD)   > 60.0  ml/min


 


Glucose   255 H  ()  mg/dL


 


POC Glucose    ()  mg/dL


 


Hemoglobin A1c    (4.5-6.2)  %


 


Calcium   8.8  (8.5-10.1)  mg/dL


 


Magnesium   1.7  (1.5-2.0)  mg/dL


 


Total Bilirubin    (0.2-1.0)  mg/dL


 


AST    (15-37)  IU/L


 


ALT    (14-63)  IU/L


 


Alkaline Phosphatase    ()  U/L


 


Troponin I    (0.000-0.056)  ng/mL


 


B-Natriuretic Peptide    (<100)  PG/ML


 


Total Protein    (6.4-8.2)  g/dL


 


Albumin    (3.4-5.0)  g/dL


 


Globulin    (2.0-3.5)  g/dL


 


Albumin/Globulin Ratio    (1.3-2.8)  











Nikunj Results Last 24 Hours: 


 Microbiology











 05/28/18 21:00 Gram Stain - Final





 Sputum - Expectorated 











Med Orders - Current: 


 Current Medications





Acetaminophen (Tylenol)  650 mg PO Q4H PRN


   PRN Reason: Pain (Mild 1-3)/fever


Albuterol/Ipratropium (Duoneb 3.0-0.5 Mg/3 Ml)  3 ml NEB Q4HRRT [facility]


   Last Admin: 05/29/18 09:45 Dose:  3 ml


Azithromycin (Zithromax)  250 mg PO Q24H [facility]


   Last Admin: 05/28/18 14:23 Dose:  Not Given


Docusate Sodium (Colace)  100 mg PO BID PRN


   PRN Reason: Constipation


Enoxaparin Sodium (Lovenox)  40 mg SUBCUT Q24H [facility]


   Last Admin: 05/28/18 14:11 Dose:  40 mg


Insulin Aspart (Novolog)  0 unit SUBCUT ACBED [facility]; Protocol


   Last Admin: 05/29/18 08:54 Dose:  Not Given


Lisinopril (Prinivil)  10 mg PO DAILY [facility]


   Last Admin: 05/29/18 08:34 Dose:  10 mg


Nicotine (Habitrol)  21 mg TRDERM DAILY [facility]


   Last Admin: 05/29/18 08:35 Dose:  21 mg


Ondansetron HCl (Zofran Odt)  4 mg PO Q4H PRN


   PRN Reason: nausea, able to take PO


Sodium Chloride (Saline Flush)  10 ml FLUSH ASDIRECTED PRN


   PRN Reason: Keep Vein Open


   Last Admin: 05/28/18 10:48 Dose:  10 ml


Sodium Chloride (Saline Flush)  2.5 ml FLUSH ASDIRECTED PRN


   PRN Reason: Keep Vein Open


   Last Admin: 05/28/18 10:49 Dose:  2.5 ml





Discontinued Medications





Albuterol/Ipratropium (Duoneb 3.0-0.5 Mg/3 Ml)  3 ml NEB ONETIME ONE


   Stop: 05/28/18 10:34


   Last Admin: 05/28/18 10:48 Dose:  3 ml


Albuterol/Ipratropium (Duoneb 3.0-0.5 Mg/3 Ml)  3 ml NEB Q4HRRT PRN


   PRN Reason: Shortness of Breath


Aspirin (Aspirin)  324 mg PO ONETIME ONE


   Stop: 05/28/18 10:34


   Last Admin: 05/28/18 10:48 Dose:  324 mg


Azithromycin (Zithromax)  500 mg PO ONETIME ONE


   Stop: 05/28/18 13:52


   Last Admin: 05/28/18 14:18 Dose:  500 mg


Furosemide (Lasix)  60 mg IVPUSH NOW ONE


   Stop: 05/28/18 11:35


   Last Admin: 05/28/18 11:55 Dose:  60 mg


Furosemide (Lasix)  40 mg IVPUSH NOW ONE


   Stop: 05/29/18 08:17


   Last Admin: 05/29/18 08:30 Dose:  40 mg


Insulin Aspart (Novolog)  0 unit SUBCUT ACBED [facility]; Protocol


   Last Admin: 05/29/18 07:43 Dose:  2 units


Methylprednisolone Sodium Succinate (Solu-Medrol)  125 mg IVPUSH ONETIME ONE


   Stop: 05/28/18 10:54


   Last Admin: 05/28/18 10:56 Dose:  125 mg


Nitroglycerin (Nitro-Bid 2%)  0.5 gm TOP ONETIME ONE


   Stop: 05/28/18 10:34


   Last Admin: 05/28/18 10:48 Dose:  0.5 gm











- Exam


Quality Assessment: Supplemental Oxygen


General: Alert, Oriented


HEENT: Pupils Equal, Pupils Reactive, EOMI, Mucous Membr. Moist/Pink


Neck: Supple, No JVD


Lungs: Normal Respiratory Effort, Decreased Breath Sounds (BL bases ), Crackles 

(BL bases )


Cardiovascular: Regular Rate, Regular Rhythm


GI/Abdominal Exam: Normal Bowel Sounds, Soft, Non-Tender, No Organomegaly


 (Male) Exam: Hernia


Back Exam: Normal Inspection


Extremities: Normal Inspection, Normal Range of Motion, Non-Tender, No Pedal 

Edema, Normal Capillary Refill


Peripheral Pulses: 2+: Dorsalis Pedis (L), Dorsalis Pedis (R)


Skin: Warm, Dry, Intact


Neurological: No New Focal Deficit


Psy/Mental Status: Alert, Normal Affect, Normal Mood





- Problem List Review


Problem List Initiated/Reviewed/Updated: Yes





- My Orders


Last 24 Hours: 


My Active Orders





05/28/18 13:01


Patient Status [ADT] Routine 


Blood Glucose Check, Bedside [RC] WITHMEALSANDBED 


Height and Weight [RC] DAILY 


Oxygen Therapy [RC] PRN 


Up ad Bobbi [RC] ASDIRECTED 


VTE/DVT Education [RC] PER UNIT ROUTINE 


Vital Signs [RC] Q4H 


Acetaminophen [Tylenol]   650 mg PO Q4H PRN 


Docusate Sodium [Colace]   100 mg PO BID PRN 


Ondansetron [Zofran ODT]   4 mg PO Q4H PRN 


Resuscitation Status Routine 





05/28/18 13:02


Intake and Output [RC] QSHIFT 


Sequential Compression Device [OM.PC] Per Unit Routine 





05/28/18 13:15


Enoxaparin [Lovenox]   40 mg SUBCUT Q24H 





05/28/18 13:20


RT Aerosol Therapy [RC] ASDIRECTED 


Echo Comp wo Cont [US] Routine 





05/28/18 13:30


Lisinopril [Prinivil]   10 mg PO DAILY 


Nicotine [Habitrol]   21 mg TRDERM DAILY 





05/28/18 14:00


Azithromycin [Zithromax]   250 mg PO Q24H 





05/28/18 16:22


Consult to Diabetes Educator [Consult to Diabetic Nurse Specialist] [CONS] 

Routine 





05/28/18 16:30


Albuterol/Ipratropium [DuoNeb 3.0-0.5 MG/3 ML]   3 ml NEB Q4HRRT 





05/28/18 18:02


Blood Culture x2 Reflex Set [OM.PC] Stat 





05/28/18 19:00


Telemetry Monitoring [Cardiac Monitoring] [RC] .AS DIRECTED 





05/28/18 19:19


CULTURE BLOOD [BC] Stat 





05/28/18 19:32


CULTURE BLOOD [BC] Stat 





05/28/18 21:00


CULTURE SPUTUM + SMEAR [RM] Routine 





05/30/18 05:11


BASIC METABOLIC PANEL,BMP [CHEM] AM 


CBC WITH AUTO DIFF [HEME] AM 





05/31/18 05:11


BASIC METABOLIC PANEL,BMP [CHEM] AM 


CBC WITH AUTO DIFF [HEME] AM 





06/01/18 05:11


BASIC METABOLIC PANEL,BMP [CHEM] AM 


CBC WITH AUTO DIFF [HEME] AM 





06/02/18 05:11


BASIC METABOLIC PANEL,BMP [CHEM] AM 


CBC WITH AUTO DIFF [HEME] AM 














- Plan


Plan:: 


#Acute Hypoxic Respiratory Failure, improving


-likely multifactorial secondary to Acute CHF exacerbation and Acute COPD 

exacerbation, improving


-continue cardiac monitoring and supplemental O2





#Acute CHF Exacerbation


-echo obtained, awaiting reading


-administer Lasix 40 mg IV single dose


-continue BP control


-continue monitoring





#Acute COPD Exacerbation


#Tobacco Abuse, 1 ppd, 20 pack year history 


-continue duonebs PRN and Azithromycin 


-counselled smoking cessation


-continue nicotine patch 





#Leukocytosis, likely secondary to Solumedrol received in ED 


-patient afebrile with stable vitals


-f/u blood and sputum cultures


-on Azithromycin 


 


#HTN, improving 


-no prior diagnosis but likely long standing 


-on Lisinopril 10 mg QD 


-continue monitor and increase dose or add medications as tolerated 





#Uncontrolled Diabetes 


#Hyperglycemia, secondary to above  


-no previous diagnosis of DM, HbA1C obtained yesterday 10%


-BG ranging 200-250 on SSI low dose 


Plan:


-increase SSI to medium dose 


-consult DM educator 


-continue monitoring 





code: full


diet: cardiac


DVT Prophylaxis: Lovenox


dispo: anticipate tomorrow pending continued improvement 


-

## 2018-05-30 VITALS — DIASTOLIC BLOOD PRESSURE: 79 MMHG | SYSTOLIC BLOOD PRESSURE: 134 MMHG

## 2018-05-30 LAB
CHLORIDE SERPL-SCNC: 102 MMOL/L (ref 98–107)
SODIUM SERPL-SCNC: 139 MMOL/L (ref 136–148)

## 2018-05-30 NOTE — [id number]
**Discharge Summary





- Hospital Course


Free Text/Narrative:: 





53 yo m admitted 5/28/18 for elevated BP without prior diagnosis of HTN and 

acute hypoxic respiratory failure secondary to Acute CHF exacerbation. He had 

not seen a physician in 10 years due to lack of insurance and denied any PMH 

prior to current admission. He was not volume overloaded but initial CXR was 

consistent with Acute CHF and BNP was elevated. He was treated with Lasix for 

his CHF and started on Lisinopril for his HTN. His symptoms which were mainly 

SOB and orthopnea improved rapidly. Echocardiogram was equivocal in 

differentiating systolic vs diastolic dysfunction. His EF was 40% and he was 

found to have generalized hypokinesis. Echo reading reported poor image quality 

but suggested correlating clinically for systolic dysfunction and based on 

patients rapid recovery, lack of volume overload and possible underlying copd 

contributing to symptoms at presentation, chances of systolic dysfunction would 

be less favored over diastolic dysfunction. He was monitored on tele during 

stay and did have bouts of ST but was mostly NSR. He was discharged home on 

Lisinopril 20 mg QD and f/u with new PCP was arranged. 





 He also likely has undiagnosed underlying COPD from 20 pack year smoking 

history and Acute COPD exacerbation likely also contributed to his hypoxia as 

evidenced by improvement of breathing with nebulizer treatments. He received 

single dose of Solumedrol and was treated with Azithromycin and duonebs during 

his stay. He denied and recent or current chest pain, edema, palpitations, 

syncope, numbness ,tingling, fever, chills or night sweats. He was given 

samples and prescriptions for Albuterol INH QID and Salmeterol BID. He was also 

giiven Azithro 250 mg QD to complete 5 day course. Out-patient PFT was 

recommneded after 4-6 weeks. He was counselled on smoking cessation. 





He was also found to have DM with HbA1C of 10%. He had no diagnosis of DM prior 

to this. He as treated with SSI during admission and discharge home on 

Metformin 500 mg BID which he is instructed to increase to 1000 mg BID after 1 

week.





Discharge Diagnosis 





#HTN, newly diagnosed


#CHF, likely Diastolic with EF 40%, likely secondary to uncontrolled HTN


#Acute CHF Exacerbation, resolved 


-Echocardiogram was equivocal in differentiating systolic vs diastolic 

dysfunction. EF was 40% and he was found to have generalized hypokinesis


Plan


-start Lisinopril 20 mg QD


-recommended weight loss 


-f/u with PCP arranged 





#Acute COPD Exacerbation


#Tobacco Abuse, 1 ppd, 20 pack year history 


-given samples of Albuterol INH and Salmeterol


-prescribed Azithromycin 250 mg QD for 2 days  


-counselled smoking cessation


-recommend out-patient PFT in 4-6 weeks





#Uncontrolled Diabetes 


 -no previous diagnosis of DM, HbA1C obtained yesterday 10%


-consulted DM educator 


-prescribed metformin 1000 mg BID, start with 0.5 tabs BID for 1 week then 

increase to 1 gm BID





#Acute Hypoxic Respiratory Failure, resolved


-likely multifactorial secondary to Acute CHF exacerbation and Acute COPD 

exacerbation





#Leukocytosis, likely secondary to Solumedrol received in ED 














- Discharge Data


Discharge Date: 05/30/18


Discharge Disposition: Home, Self-Care 01


Condition: Good





- Patient Summary/Data


Consults: 


 Consultations





05/28/18 16:22


Consult to Diabetes Educator [Consult to Diabetic Nurse Specialist] [CONS] 

Routine 














- Patient Instructions


Diet: Diabetic Diet (low carb diet ), Weight Loss Diet


Activity: As Tolerated


Notify Provider of: Fever, Increased Pain, Swelling and Redness, Drainage, 

Nausea and/or Vomiting





- Discharge Plan


Prescriptions/Med Rec: 


Albuterol [Proventil HFA] 2 puff INH Q4H PRN #1 inhaler


 PRN Reason: Shortness Of Breath


Azithromycin [Zithromax] 250 mg PO DAILY #2 tab


Fluticasone/Salmeterol [Advair 250-50] 1 puff INH BID #1 diskus


Lisinopril 20 mg PO DAILY #30 tablet


metFORMIN HCl [Metformin HCl] 1,000 mg PO BID #60 tablet


Home Medications: 


 Home Meds





Albuterol [Proventil HFA] 2 puff INH Q4H PRN #1 inhaler 05/30/18 [Rx]


Azithromycin [Zithromax] 250 mg PO DAILY #2 tab 05/30/18 [Rx]


Fluticasone/Salmeterol [Advair 250-50] 1 puff INH BID #1 diskus 05/30/18 [Rx]


Lisinopril 20 mg PO DAILY #30 tablet 05/30/18 [Rx]


metFORMIN HCl [Metformin HCl] 1,000 mg PO BID #60 tablet 05/30/18 [Rx]








Referrals: 


PCP,Unknown [Primary Care Provider] - 





- Discharge Summary/Plan Comment


DC Time >30 min.: No





- Patient Data


Vitals - Most Recent: 


 Last Vital Signs











Temp  36.8 C   05/30/18 04:00


 


Pulse  99   05/30/18 04:00


 


Resp  20   05/30/18 04:00


 


BP  120/64   05/30/18 04:00


 


Pulse Ox  93 L  05/30/18 04:00











Weight - Most Recent: 141.702 kg


I&O - Last 24 hours: 


 Intake & Output











 05/29/18 05/30/18 05/30/18





 22:59 06:59 14:59


 


Intake Total 3040 700 


 


Output Total 3020 2100 


 


Balance 20 -1400 











Lab Results - Last 24 hrs: 


 Laboratory Results - last 24 hr











  05/29/18 05/29/18 05/29/18 Range/Units





  06:40 11:34 16:38 


 


WBC     (4.0-11.0)  K/uL


 


RBC     (4.50-5.90)  M/uL


 


Hgb     (13.0-17.0)  g/dL


 


Hct     (38.0-50.0)  %


 


MCV     (80.0-98.0)  fL


 


MCH     (27.0-32.0)  pg


 


MCHC     (31.0-37.0)  g/dL


 


RDW Std Deviation     (28.0-62.0)  fl


 


RDW Coeff of Yesi     (11.0-15.0)  %


 


Plt Count     (150-400)  K/uL


 


MPV     (7.40-12.00)  fL


 


Neut % (Auto)     (48.0-80.0)  %


 


Lymph % (Auto)     (16.0-40.0)  %


 


Mono % (Auto)     (0.0-15.0)  %


 


Eos % (Auto)     (0.0-7.0)  %


 


Baso % (Auto)     (0.0-1.5)  %


 


Neut # (Auto)     (1.4-5.7)  K/uL


 


Lymph # (Auto)     (0.6-2.4)  K/uL


 


Mono # (Auto)     (0.0-0.8)  K/uL


 


Eos # (Auto)     (0.0-0.7)  K/uL


 


Baso # (Auto)     (0.0-0.1)  K/uL


 


Nucleated RBC %     /100WBC


 


Nucleated RBCs #     K/uL


 


Sodium     (136-148)  mmol/L


 


Potassium     (3.5-5.1)  mmol/L


 


Chloride     ()  mmol/L


 


Carbon Dioxide     (21.0-32.0)  mmol/L


 


BUN     (7.0-18.0)  mg/dL


 


Creatinine     (0.8-1.3)  mg/dL


 


Est Cr Clr Drug Dosing     


 


Estimated GFR (MDRD)     ml/min


 


Glucose     ()  mg/dL


 


POC Glucose  209 H  279 H  227 H  ()  mg/dL


 


Calcium     (8.5-10.1)  mg/dL














  05/29/18 05/30/18 05/30/18 Range/Units





  21:44 05:20 05:20 


 


WBC   12.61 H   (4.0-11.0)  K/uL


 


RBC   5.31   (4.50-5.90)  M/uL


 


Hgb   15.9   (13.0-17.0)  g/dL


 


Hct   47.8   (38.0-50.0)  %


 


MCV   90.0   (80.0-98.0)  fL


 


MCH   29.9   (27.0-32.0)  pg


 


MCHC   33.3   (31.0-37.0)  g/dL


 


RDW Std Deviation   44.4   (28.0-62.0)  fl


 


RDW Coeff of Yesi   14   (11.0-15.0)  %


 


Plt Count   237   (150-400)  K/uL


 


MPV   11.20   (7.40-12.00)  fL


 


Neut % (Auto)   62.3   (48.0-80.0)  %


 


Lymph % (Auto)   27.9   (16.0-40.0)  %


 


Mono % (Auto)   9.0   (0.0-15.0)  %


 


Eos % (Auto)   0.6   (0.0-7.0)  %


 


Baso % (Auto)   0.2   (0.0-1.5)  %


 


Neut # (Auto)   7.9 H   (1.4-5.7)  K/uL


 


Lymph # (Auto)   3.5 H   (0.6-2.4)  K/uL


 


Mono # (Auto)   1.1 H   (0.0-0.8)  K/uL


 


Eos # (Auto)   0.1   (0.0-0.7)  K/uL


 


Baso # (Auto)   0.0   (0.0-0.1)  K/uL


 


Nucleated RBC %   0.0   /100WBC


 


Nucleated RBCs #   0   K/uL


 


Sodium    139  (136-148)  mmol/L


 


Potassium    4.6  (3.5-5.1)  mmol/L


 


Chloride    102  ()  mmol/L


 


Carbon Dioxide    33.8 H  (21.0-32.0)  mmol/L


 


BUN    23 H  (7.0-18.0)  mg/dL


 


Creatinine    1.1  (0.8-1.3)  mg/dL


 


Est Cr Clr Drug Dosing    TNP  


 


Estimated GFR (MDRD)    > 60.0  ml/min


 


Glucose    174 H  ()  mg/dL


 


POC Glucose  218 H    ()  mg/dL


 


Calcium    8.5  (8.5-10.1)  mg/dL














  05/30/18 Range/Units





  06:22 


 


WBC   (4.0-11.0)  K/uL


 


RBC   (4.50-5.90)  M/uL


 


Hgb   (13.0-17.0)  g/dL


 


Hct   (38.0-50.0)  %


 


MCV   (80.0-98.0)  fL


 


MCH   (27.0-32.0)  pg


 


MCHC   (31.0-37.0)  g/dL


 


RDW Std Deviation   (28.0-62.0)  fl


 


RDW Coeff of Yesi   (11.0-15.0)  %


 


Plt Count   (150-400)  K/uL


 


MPV   (7.40-12.00)  fL


 


Neut % (Auto)   (48.0-80.0)  %


 


Lymph % (Auto)   (16.0-40.0)  %


 


Mono % (Auto)   (0.0-15.0)  %


 


Eos % (Auto)   (0.0-7.0)  %


 


Baso % (Auto)   (0.0-1.5)  %


 


Neut # (Auto)   (1.4-5.7)  K/uL


 


Lymph # (Auto)   (0.6-2.4)  K/uL


 


Mono # (Auto)   (0.0-0.8)  K/uL


 


Eos # (Auto)   (0.0-0.7)  K/uL


 


Baso # (Auto)   (0.0-0.1)  K/uL


 


Nucleated RBC %   /100WBC


 


Nucleated RBCs #   K/uL


 


Sodium   (136-148)  mmol/L


 


Potassium   (3.5-5.1)  mmol/L


 


Chloride   ()  mmol/L


 


Carbon Dioxide   (21.0-32.0)  mmol/L


 


BUN   (7.0-18.0)  mg/dL


 


Creatinine   (0.8-1.3)  mg/dL


 


Est Cr Clr Drug Dosing   


 


Estimated GFR (MDRD)   ml/min


 


Glucose   ()  mg/dL


 


POC Glucose  183 H  ()  mg/dL


 


Calcium   (8.5-10.1)  mg/dL











MARIANA Results - Last 24 hrs: 


 Microbiology











 05/28/18 19:32 Aerobic Blood Culture - Preliminary





 Blood - Venous - Lab Draw    NO GROWTH AFTER 1 DAY





 Anaerobic Blood Culture - Preliminary





    NO GROWTH AFTER 1 DAY


 


 05/28/18 19:19 Aerobic Blood Culture - Preliminary





 Blood - Venous    NO GROWTH AFTER 1 DAY





 Anaerobic Blood Culture - Preliminary





    NO GROWTH AFTER 1 DAY











Med Orders - Current: 


 Current Medications





Acetaminophen (Tylenol)  650 mg PO Q4H PRN


   PRN Reason: Pain (Mild 1-3)/fever


Albuterol/Ipratropium (Duoneb 3.0-0.5 Mg/3 Ml)  3 ml NEB Q4HRRT [facility]


   Last Admin: 05/30/18 06:00 Dose:  Not Given


Azithromycin (Zithromax)  250 mg PO Q24H [facility]


   Last Admin: 05/29/18 13:58 Dose:  250 mg


Docusate Sodium (Colace)  100 mg PO BID PRN


   PRN Reason: Constipation


Enoxaparin Sodium (Lovenox)  40 mg SUBCUT Q24H [facility]


   Last Admin: 05/29/18 12:38 Dose:  40 mg


Insulin Aspart (Novolog)  0 unit SUBCUT ACBED [facility]; Protocol


   Last Admin: 05/30/18 06:51 Dose:  2 units


Lisinopril (Prinivil)  10 mg PO DAILY [facility]


   Last Admin: 05/29/18 08:34 Dose:  10 mg


Nicotine (Habitrol)  21 mg TRDERM DAILY [facility]


   Last Admin: 05/29/18 08:35 Dose:  21 mg


Ondansetron HCl (Zofran Odt)  4 mg PO Q4H PRN


   PRN Reason: nausea, able to take PO


Sodium Chloride (Saline Flush)  10 ml FLUSH ASDIRECTED PRN


   PRN Reason: Keep Vein Open


   Last Admin: 05/28/18 10:48 Dose:  10 ml


Sodium Chloride (Saline Flush)  2.5 ml FLUSH ASDIRECTED PRN


   PRN Reason: Keep Vein Open


   Last Admin: 05/28/18 10:49 Dose:  2.5 ml





Discontinued Medications





Albuterol/Ipratropium (Duoneb 3.0-0.5 Mg/3 Ml)  3 ml NEB ONETIME ONE


   Stop: 05/28/18 10:34


   Last Admin: 05/28/18 10:48 Dose:  3 ml


Albuterol/Ipratropium (Duoneb 3.0-0.5 Mg/3 Ml)  3 ml NEB Q4HRRT PRN


   PRN Reason: Shortness of Breath


Aspirin (Aspirin)  324 mg PO ONETIME ONE


   Stop: 05/28/18 10:34


   Last Admin: 05/28/18 10:48 Dose:  324 mg


Azithromycin (Zithromax)  500 mg PO ONETIME ONE


   Stop: 05/28/18 13:52


   Last Admin: 05/28/18 14:18 Dose:  500 mg


Furosemide (Lasix)  60 mg IVPUSH NOW ONE


   Stop: 05/28/18 11:35


   Last Admin: 05/28/18 11:55 Dose:  60 mg


Furosemide (Lasix)  40 mg IVPUSH NOW ONE


   Stop: 05/29/18 08:17


   Last Admin: 05/29/18 08:30 Dose:  40 mg


Insulin Aspart (Novolog)  0 unit SUBCUT ACBED [facility]; Protocol


   Last Admin: 05/29/18 07:43 Dose:  2 units


Methylprednisolone Sodium Succinate (Solu-Medrol)  125 mg IVPUSH ONETIME ONE


   Stop: 05/28/18 10:54


   Last Admin: 05/28/18 10:56 Dose:  125 mg


Nitroglycerin (Nitro-Bid 2%)  0.5 gm TOP ONETIME ONE


   Stop: 05/28/18 10:34


   Last Admin: 05/28/18 10:48 Dose:  0.5 gm

## 2018-05-30 NOTE — ECHO
The echocardiogram report can be seen in this patient's EMR (Electronic Medical 
Record) in the Reports section.  The echocardiogram report has also been 
scanned into PACS and can be seen there.
MELCHOR